# Patient Record
Sex: MALE | Race: WHITE | NOT HISPANIC OR LATINO | Employment: FULL TIME | URBAN - METROPOLITAN AREA
[De-identification: names, ages, dates, MRNs, and addresses within clinical notes are randomized per-mention and may not be internally consistent; named-entity substitution may affect disease eponyms.]

---

## 2019-06-10 ENCOUNTER — HOSPITAL ENCOUNTER (EMERGENCY)
Facility: HOSPITAL | Age: 24
Discharge: HOME | End: 2019-06-10
Attending: EMERGENCY MEDICINE
Payer: COMMERCIAL

## 2019-06-10 VITALS
HEIGHT: 66 IN | DIASTOLIC BLOOD PRESSURE: 68 MMHG | RESPIRATION RATE: 14 BRPM | WEIGHT: 143 LBS | BODY MASS INDEX: 22.98 KG/M2 | SYSTOLIC BLOOD PRESSURE: 135 MMHG | OXYGEN SATURATION: 99 % | HEART RATE: 81 BPM | TEMPERATURE: 98 F

## 2019-06-10 DIAGNOSIS — I80.02 THROMBOPHLEBITIS OF SUPERFICIAL VEINS OF LEFT LOWER EXTREMITY: Primary | ICD-10-CM

## 2019-06-10 PROCEDURE — 99282 EMERGENCY DEPT VISIT SF MDM: CPT

## 2019-06-10 NOTE — ED PROVIDER NOTES
"Encounter Date: 6/10/2019    SCRIBE #1 NOTE: I, Berkley Griffin, am scribing for, and in the presence of, Jeannie Ochoa PA-C.       History     Chief Complaint   Patient presents with    Varicose Veins     Pt states he believes a varicose vein popped in his left calf.        Time seen by provider: 2:35 PM on 06/10/2019    Boone Mills Jr. is a 24 y.o. male with no PMHx or SHx on file who presents to the ED with complaints of left calf pain that was noticed a few days ago. The patient states "I popped a varicose vein". He endorses frequently being hunched over and "has frequent blood restriction in legs". He reports calf pain is localized to the region where the varicose vein popped and is described as blue with surrounding redness. The patient applied ice to the area with improvements. He denies history of blood clots. The patient has no other medical concerns or complaints at this moment. He denies onset of any other new symptoms currently. NKDA noted.     The history is provided by the patient.     Review of patient's allergies indicates:  No Known Allergies  History reviewed. No pertinent past medical history.  History reviewed. No pertinent surgical history.  History reviewed. No pertinent family history.  Social History     Tobacco Use    Smoking status: Never Smoker    Smokeless tobacco: Current User     Types: Chew   Substance Use Topics    Alcohol use: Not on file    Drug use: Not on file     Review of Systems   Constitutional: Negative for chills and fever.   Respiratory: Negative for cough.    Cardiovascular: Negative for chest pain.   Gastrointestinal: Negative for abdominal pain and nausea.   Genitourinary: Negative for difficulty urinating.   Musculoskeletal: Positive for myalgias (left calf). Negative for back pain and joint swelling.   Skin: Negative for pallor and rash.   Neurological: Negative for weakness and numbness.   Hematological: Does not bruise/bleed easily. "   Psychiatric/Behavioral: The patient is not nervous/anxious.        Physical Exam     Initial Vitals [06/10/19 1424]   BP Pulse Resp Temp SpO2   135/68 81 14 98.1 °F (36.7 °C) 99 %      MAP       --         Physical Exam    Nursing note and vitals reviewed.  Constitutional: He appears well-developed and well-nourished. He is not diaphoretic. He is cooperative.  Non-toxic appearance. He does not have a sickly appearance. No distress.   HENT:   Head: Normocephalic and atraumatic.   Right Ear: External ear normal.   Left Ear: External ear normal.   Nose: Nose normal.   Eyes: Lids are normal.   Neck: Normal range of motion and full passive range of motion without pain.   Cardiovascular: Normal rate, regular rhythm and normal heart sounds. Exam reveals no gallop and no friction rub.    No murmur heard.  Pulmonary/Chest: Breath sounds normal. He has no wheezes. He has no rhonchi. He has no rales.   Abdominal: Normal appearance. There is no rigidity.   Musculoskeletal:   Superficial vein noted with associated tenderness to posterior left lower leg. No engorgement noted. No calf tenderness. Negative Homans sign.    Neurological: He is alert.   Skin: Skin is warm, dry and intact. No rash noted.         ED Course   Procedures  Labs Reviewed - No data to display       Imaging Results    None          Medical Decision Making:   History:   Old Medical Records: I decided to obtain old medical records.       APC / Resident Notes:   Urgent evaluation of a 24-year-old male who presents with pain to the left lower extremity.  He reports he had a engorged vein that he felt pop.  At this time he has some mild tenderness. No calf tenderness. No risk for DVT.  The area is localized over the superficial vein consistent with superficial thrombophlebitis.  We discussed symptomatic treatment. Return precautions given.       Scribe Attestation:   Scribe #1: I performed the above scribed service and the documentation accurately describes the  services I performed. I attest to the accuracy of the note.      I, Jeannie Ochoa PA-C, personally performed the services described in this documentation. All medical record entries made by the scribe were at my direction and in my presence.  I have reviewed the chart and agree that the record reflects my personal performance and is accurate and complete. Jeannie Ochoa PA-C.  2:43 PM 06/10/2019             Clinical Impression:       ICD-10-CM ICD-9-CM   1. Thrombophlebitis of superficial veins of left lower extremity I80.02 451.0         Disposition:   Disposition: Discharged  Condition: Stable                        Jeannie Ochoa PA-C  06/10/19 1737

## 2022-07-07 ENCOUNTER — OFFICE VISIT (OUTPATIENT)
Dept: URGENT CARE | Facility: CLINIC | Age: 27
End: 2022-07-07
Payer: OTHER MISCELLANEOUS

## 2022-07-07 VITALS
HEART RATE: 102 BPM | TEMPERATURE: 98 F | SYSTOLIC BLOOD PRESSURE: 151 MMHG | DIASTOLIC BLOOD PRESSURE: 86 MMHG | RESPIRATION RATE: 16 BRPM | OXYGEN SATURATION: 98 %

## 2022-07-07 DIAGNOSIS — S83.91XA SPRAIN OF RIGHT KNEE, UNSPECIFIED LIGAMENT, INITIAL ENCOUNTER: Primary | ICD-10-CM

## 2022-07-07 DIAGNOSIS — S61.214A LACERATION OF RIGHT RING FINGER WITHOUT FOREIGN BODY WITHOUT DAMAGE TO NAIL, INITIAL ENCOUNTER: ICD-10-CM

## 2022-07-07 DIAGNOSIS — Z02.6 ENCOUNTER RELATED TO WORKER'S COMPENSATION CLAIM: ICD-10-CM

## 2022-07-07 LAB
BREATH ALCOHOL: 0
CTP QC/QA: YES
POC 5 PANEL DRUG SCREEN: ABNORMAL

## 2022-07-07 PROCEDURE — 80305 DRUG TEST PRSMV DIR OPT OBS: CPT | Mod: S$GLB,,, | Performed by: PHYSICIAN ASSISTANT

## 2022-07-07 PROCEDURE — 99203 PR OFFICE/OUTPT VISIT, NEW, LEVL III, 30-44 MIN: ICD-10-PCS | Mod: 25,S$GLB,, | Performed by: PHYSICIAN ASSISTANT

## 2022-07-07 PROCEDURE — 90471 IMMUNIZATION ADMIN: CPT | Mod: S$GLB,,, | Performed by: PHYSICIAN ASSISTANT

## 2022-07-07 PROCEDURE — 99203 OFFICE O/P NEW LOW 30 MIN: CPT | Mod: 25,S$GLB,, | Performed by: PHYSICIAN ASSISTANT

## 2022-07-07 PROCEDURE — 73564 XR KNEE COMP 4 OR MORE VIEWS RIGHT: ICD-10-PCS | Mod: RT,S$GLB,, | Performed by: RADIOLOGY

## 2022-07-07 PROCEDURE — 80305 OOH NON-DOT DRUG SCREEN: ICD-10-PCS | Mod: S$GLB,,, | Performed by: PHYSICIAN ASSISTANT

## 2022-07-07 PROCEDURE — 90471 TD VACCINE GREATER THAN OR EQUAL TO 7YO WITH PRESERVATIVE IM: ICD-10-PCS | Mod: S$GLB,,, | Performed by: PHYSICIAN ASSISTANT

## 2022-07-07 PROCEDURE — 73564 X-RAY EXAM KNEE 4 OR MORE: CPT | Mod: RT,S$GLB,, | Performed by: RADIOLOGY

## 2022-07-07 PROCEDURE — 82075 POCT BREATH ALCOHOL TEST: ICD-10-PCS | Mod: S$GLB,,, | Performed by: PHYSICIAN ASSISTANT

## 2022-07-07 PROCEDURE — 90714 TD VACCINE GREATER THAN OR EQUAL TO 7YO WITH PRESERVATIVE IM: ICD-10-PCS | Mod: S$GLB,,, | Performed by: PHYSICIAN ASSISTANT

## 2022-07-07 PROCEDURE — 90714 TD VACC NO PRESV 7 YRS+ IM: CPT | Mod: S$GLB,,, | Performed by: PHYSICIAN ASSISTANT

## 2022-07-07 PROCEDURE — 82075 ASSAY OF BREATH ETHANOL: CPT | Mod: S$GLB,,, | Performed by: PHYSICIAN ASSISTANT

## 2022-07-07 RX ORDER — MUPIROCIN 20 MG/G
OINTMENT TOPICAL
Qty: 22 G | Refills: 1 | Status: SHIPPED | OUTPATIENT
Start: 2022-07-07 | End: 2022-09-02

## 2022-07-07 RX ORDER — IBUPROFEN 600 MG/1
600 TABLET ORAL EVERY 6 HOURS PRN
Qty: 30 TABLET | Refills: 1 | Status: SHIPPED | OUTPATIENT
Start: 2022-07-07 | End: 2022-07-12 | Stop reason: SDUPTHER

## 2022-07-07 NOTE — LETTER
[facility] - Urgent Care  [address] [facility], [address]  [place] [address]  Phone: [phone]  Fax: [phone]  [facility]: [id number]    Pt Name: Boone Mills Jr.  Injury Date: 07/06/2022   Employee ID: [id number] Date of First Treatment: 07/07/2022   Company: [employer]      Appointment Time: 11:00 AM Arrived: 10:46 AM   Provider: Erica Avendaño PA-C Time Out:1:50 PM      Office Treatment:   1. Sprain of right knee, unspecified ligament, initial encounter    2. Laceration of right ring finger without foreign body without damage to nail, initial encounter    3. Encounter related to worker's compensation claim      Medications Ordered This Encounter   Medications    ibuprofen (ADVIL,MOTRIN) 600 MG tablet    mupirocin (BACTROBAN) 2 % ointment      Patient Instructions: Attention not to aggravate affected area, Apply ice 24-48 hours then apply heat/warm soaks, Elevated affected area, Keep dressing clean/dry/covered    Restrictions: Avoid climbing/kneeling/squatting, Limited use of right hand and arm (Home today and tomorrow.)     Return Appointment: 7/14/2022 at 9:30 AM   SBS

## 2022-07-07 NOTE — PROGRESS NOTES
Subjective:       Patient ID: Boone Mills Jr. is a 27 y.o. male.    Chief Complaint: Hand Pain (WC Right Ring Finger) and Knee Injury (Right Knee WC)    07/06/2022, Right Middle Finger and Right Knee, Amarilys's A/C, - Patient here today for a laceration on his right ring finger. He was hanging duckwork and his hand slipped off the tool and was cut by sheet metal at approximately 4 PM yesterday. He states that it is tender to the touch. Pain level 4/10. He cleaned it water when it happened and when he went home he had used neosporin. He states that this morning when he woke up his knee was swollen he could not get out of bed. He states that he spends a lot of time on his knees while he works. He was kneeling in the attick yesterday when he got the cut to his finger.  He describes the pain as tender on the lateral side and bottom portion of his knee. Pain level 8/10. He states that he has been having a throbbing shooting pain. He states that the pain does radiate down his leg. He has taken a BC Powder this morning.      Knee Injury  This is a new problem. The current episode started yesterday. The problem occurs daily. The problem has been unchanged. Associated symptoms include arthralgias and joint swelling. Pertinent negatives include no chest pain, chills, fever or headaches. The symptoms are aggravated by walking, twisting, standing and bending. He has tried NSAIDs and position changes for the symptoms. The treatment provided moderate relief.   Hand Pain   The incident occurred 12 to 24 hours ago. The incident occurred at work. The pain is present in the right fingers. The quality of the pain is described as aching. The pain does not radiate. The pain is at a severity of 4/10. The pain is mild. The pain has been intermittent since the incident. Pertinent negatives include no chest pain. The symptoms are aggravated by movement. He has tried NSAIDs for the symptoms. The treatment provided  moderate relief.       Constitution: Negative for chills and fever.   Cardiovascular: Negative for chest pain.   Respiratory: Negative for shortness of breath.    Musculoskeletal: Positive for pain, joint pain, joint swelling and pain with walking. Negative for abnormal ROM of joint.   Skin: Positive for laceration and bruising. Negative for erythema.   Neurological: Negative for headaches and loss of consciousness.   Psychiatric/Behavioral: Negative for nervous/anxious. The patient is not nervous/anxious.         Objective:      Physical Exam  Vitals and nursing note reviewed.   Constitutional:       General: He is not in acute distress.     Appearance: He is well-developed.   Cardiovascular:      Rate and Rhythm: Normal rate and regular rhythm.      Heart sounds: No murmur heard.    No friction rub. No gallop.   Pulmonary:      Effort: Pulmonary effort is normal.      Breath sounds: Normal breath sounds. No wheezing or rales.   Musculoskeletal:         General: Normal range of motion.      Right hand: Laceration and tenderness present. No swelling. Normal range of motion.        Hands:       Right knee: Swelling and ecchymosis present. Normal range of motion. Tenderness present over the lateral joint line, LCL and patellar tendon. No ACL or PCL tenderness. No LCL laxity, MCL laxity, ACL laxity or PCL laxity. Normal meniscus.      Instability Tests: Anterior drawer test negative. Posterior drawer test negative. Anterior Lachman test negative. Medial Joy test negative and lateral Joy test negative.        Legs:    Skin:     General: Skin is warm and dry.      Findings: No erythema or rash.   Neurological:      Mental Status: He is alert and oriented to person, place, and time.   Psychiatric:         Behavior: Behavior normal.         Thought Content: Thought content normal.         Judgment: Judgment normal.       X-Ray Knee Complete 4 Or More Views Right    Result Date: 7/7/2022  EXAMINATION: XR KNEE COMP  4 OR MORE VIEWS RIGHT CLINICAL HISTORY: Unspecified injury of right lower leg, initial encounter TECHNIQUE: AP standing of both knees, PA flexion standing views of both knees, and Merchant views of both knees were performed.  A lateral view of the right knee was also performed. COMPARISON: None FINDINGS: Right: No fracture or dislocation.  No joint effusion.  Cartilage spaces are maintained. Left: No fracture or dislocation on provided views.  Cartilage spaces are maintained.     As above. Electronically signed by: Bimal Miller MD Date:    07/07/2022 Time:    13:21    Assessment:       1. Sprain of right knee, unspecified ligament, initial encounter    2. Laceration of right ring finger without foreign body without damage to nail, initial encounter    3. Encounter related to worker's compensation claim        Plan:       Patient presents with a complaint of a laceration to his right 4th finger that occurred yesterday at approximately 4:00 a.m. on a sharp piece of metal.  He has been covering wounds to the tissue surrounding it is macerated.  He has full range of motion of the finger and no signs of infection.  I do not feel suture ring is necessary at this time.  His tetanus shot was updated.  He also is complaining of right knee pain and swelling.  Patient has job entails crawling and kneeling a lot.  He thinks he must have injured yesterday somehow.  On exam he does have some pain, tenderness, and warmth to the area.  No erythema or signs of infection.  X-ray of the knee was negative for acute abnormalities.  Will place him on ibuprofen and advised on ice and heat.  Will keep him home today and tomorrow and re-evaluate in 4 days.  Possible return to restrictions or regular duty if symptoms improve.    Medications Ordered This Encounter   Medications    ibuprofen (ADVIL,MOTRIN) 600 MG tablet     Sig: Take 1 tablet (600 mg total) by mouth every 6 (six) hours as needed for Pain.     Dispense:  30 tablet      Refill:  1    mupirocin (BACTROBAN) 2 % ointment     Sig: Apply to affected area 3 times daily     Dispense:  22 g     Refill:  1     Patient Instructions: Attention not to aggravate affected area, Apply ice 24-48 hours then apply heat/warm soaks, Elevated affected area, Keep dressing clean/dry/covered   Restrictions: Avoid climbing/kneeling/squatting, Limited use of right hand and arm (Home today and tomorrow.)  Follow up in about 4 days (around 7/11/2022).

## 2022-07-12 ENCOUNTER — OFFICE VISIT (OUTPATIENT)
Dept: URGENT CARE | Facility: CLINIC | Age: 27
End: 2022-07-12
Payer: OTHER MISCELLANEOUS

## 2022-07-12 DIAGNOSIS — S61.214D LACERATION OF RIGHT RING FINGER WITHOUT FOREIGN BODY WITHOUT DAMAGE TO NAIL, SUBSEQUENT ENCOUNTER: ICD-10-CM

## 2022-07-12 DIAGNOSIS — M70.51 INFRAPATELLAR BURSITIS OF RIGHT KNEE: ICD-10-CM
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
## 2022-07-12 PROCEDURE — 99214 OFFICE O/P EST MOD 30 MIN: CPT | Mod: S$GLB,,, | Performed by: PHYSICIAN ASSISTANT

## 2022-07-12 PROCEDURE — 99214 PR OFFICE/OUTPT VISIT, EST, LEVL IV, 30-39 MIN: ICD-10-PCS | Mod: S$GLB,,, | Performed by: PHYSICIAN ASSISTANT

## 2022-07-12 RX ORDER — IBUPROFEN 600 MG/1
600 TABLET ORAL EVERY 6 HOURS PRN
Qty: 30 TABLET | Refills: 1 | Status: SHIPPED | OUTPATIENT
Start: 2022-07-12 | End: 2022-08-11

## 2022-07-12 NOTE — LETTER
Urgent Care - [address] [address]  Phone: [phone]  Fax: [phone]  [facility]: [id number]    Pt Name: Boone Mills Jr.  Injury Date: 07/06/2022   Employee ID: [id number] Date of Treatment: 07/12/2022   Company: [employer]      Appointment Time: 11:00 AM Arrived: 10:58 AM   Provider: Arnel Lindsey PA-C Time Out: 12:05 PM     Office Treatment:   1. Work related injury    2. Infrapatellar bursitis of right knee    3. Laceration of right ring finger without foreign body without damage to nail, subsequent encounter      Medications Ordered This Encounter   Medications    ibuprofen (ADVIL,MOTRIN) 600 MG tablet           Restrictions: Avoid climbing/kneeling/squatting     Return Appointment: 7/19/2022 at 01:00 PM

## 2022-07-12 NOTE — PROGRESS NOTES
Subjective:       Patient ID: Boone Mills Jr. is a 27 y.o. male.    Chief Complaint: Knee Injury    RV, (DOI:07/06/2022), Right Middle Finger and Right Knee, Amarilys's A/C, - Patient states his right middle finger is healed with 0/10 pain today. Pain level when present right knee 4/10 and when he is not moving around  0/10 pain. If he is kneeling are squatting is when he has the throbbing shooting pain. He states his job requires about 80% of kneeling and squatting. Currently taking Ibuprofen which is not helping. He still have tenderness to the bottom and outer portion of his knee. No previous knee injury. Within a hour or two he would getting swelling while standing. BG             Knee Injury  This is a new problem. The current episode started yesterday. The problem occurs daily. The problem has been unchanged. Associated symptoms include arthralgias and joint swelling. Pertinent negatives include no chest pain, chills, fever, headaches or numbness. The symptoms are aggravated by walking, twisting, standing and bending. He has tried NSAIDs and position changes for the symptoms. The treatment provided no relief.   Hand Pain   The incident occurred 12 to 24 hours ago. The incident occurred at work. The pain is present in the right fingers. The quality of the pain is described as aching. The pain does not radiate. The pain is at a severity of 4/10. The pain is mild. The pain has been intermittent since the incident. Pertinent negatives include no chest pain or numbness. The symptoms are aggravated by movement. He has tried NSAIDs for the symptoms. The treatment provided moderate relief.       Constitution: Negative for chills and fever.   Cardiovascular: Negative for chest pain.   Musculoskeletal: Positive for joint pain, joint swelling, abnormal ROM of joint and pain with walking.   Skin: Positive for laceration. Negative for erythema.   Neurological: Negative for headaches, numbness and  tingling.        Objective:      Physical Exam  Vitals and nursing note reviewed.   Constitutional:       General: He is not in acute distress.     Appearance: He is well-developed. He is not diaphoretic.   HENT:      Head: Normocephalic and atraumatic.      Right Ear: Hearing and external ear normal.      Left Ear: Hearing and external ear normal.      Nose: Nose normal. No nasal deformity.   Eyes:      General: Lids are normal. No scleral icterus.     Conjunctiva/sclera: Conjunctivae normal.   Neck:      Trachea: Trachea normal.   Cardiovascular:      Pulses: Normal pulses.   Pulmonary:      Effort: Pulmonary effort is normal. No respiratory distress.      Breath sounds: No stridor.   Musculoskeletal:      Right hand: Laceration present. No tenderness. Normal range of motion.        Hands:       Cervical back: Normal range of motion.      Right knee: Swelling (mild) present. No deformity, ecchymosis or bony tenderness. Normal range of motion. Tenderness present. No LCL laxity, MCL laxity or ACL laxity. Normal meniscus. Normal pulse.      Instability Tests: Anterior Lachman test negative. Medial Joy test negative and lateral Joy test negative.        Legs:    Skin:     General: Skin is warm and dry.      Capillary Refill: Capillary refill takes less than 2 seconds.      Findings: Laceration present. No erythema.   Neurological:      Mental Status: He is alert. He is not disoriented.      GCS: GCS eye subscore is 4. GCS verbal subscore is 5. GCS motor subscore is 6.      Sensory: No sensory deficit.   Psychiatric:         Attention and Perception: He is attentive.         Speech: Speech normal.         Behavior: Behavior normal.         Assessment:       1. Work related injury    2. Infrapatellar bursitis of right knee    3. Laceration of right ring finger without foreign body without damage to nail, subsequent encounter        Plan:         Medications Ordered This Encounter   Medications    ibuprofen  (ADVIL,MOTRIN) 600 MG tablet     Sig: Take 1 tablet (600 mg total) by mouth every 6 (six) hours as needed for Pain.     Dispense:  30 tablet     Refill:  1         Restrictions: Avoid climbing/kneeling/squatting  Follow up in about 1 week (around 7/19/2022).

## 2022-07-19 ENCOUNTER — OFFICE VISIT (OUTPATIENT)
Dept: URGENT CARE | Facility: CLINIC | Age: 27
End: 2022-07-19
Payer: OTHER MISCELLANEOUS

## 2022-07-19 DIAGNOSIS — M70.51 INFRAPATELLAR BURSITIS OF RIGHT KNEE: Primary | ICD-10-CM

## 2022-07-19 PROCEDURE — 99213 PR OFFICE/OUTPT VISIT, EST, LEVL III, 20-29 MIN: ICD-10-PCS | Mod: S$GLB,,, | Performed by: NURSE PRACTITIONER

## 2022-07-19 PROCEDURE — 99213 OFFICE O/P EST LOW 20 MIN: CPT | Mod: S$GLB,,, | Performed by: NURSE PRACTITIONER

## 2022-07-19 NOTE — LETTER
Urgent Care - [address] [address]  Phone: [phone]  Fax: [phone]  [facility]: [id number]    Pt Name: Boone Mills JrMarquis  Injury Date: 07/06/2022   Employee ID: [id number] Date of Treatment: 07/19/2022   Company: [employer]      Appointment Time: 01:00 PM Arrived: 12:53 PM   Provider: EVELIO Kohler Time Out: 01:12 PM     Office Treatment:   1. Infrapatellar bursitis of right knee          Patient Instructions: Attention not to aggravate affected area, Daily home exercises/warm soaks    Restrictions: Regular Duty, Discharged from Occupational Health     Return Appointment: if symptoms worsen or fail to improve

## 2022-07-19 NOTE — PROGRESS NOTES
Subjective:       Patient ID: Boone Mills Jr. is a 27 y.o. male.    Chief Complaint: Knee Injury    RV, (DOI:07/06/2022), Right Middle Finger and Right Knee, Amarilys's A/C, - Right middle finger is completely healed. Right knee pain level is 0/10 with movent and without movement. He is taking Ibuprofen PRN. He also wears his knee brace every other day with some daily duties. He looking to get of his restrictions  and go back to work at full duty. BG          Knee Injury  This is a new problem. The current episode started yesterday. The problem occurs rarely. The problem has been rapidly improving. Pertinent negatives include no arthralgias, chest pain, chills, fever, headaches, joint swelling or numbness. Nothing aggravates the symptoms. He has tried nothing for the symptoms.   Hand Pain   The incident occurred 12 to 24 hours ago. The incident occurred at work. The pain is present in the right fingers. The quality of the pain is described as aching. The pain does not radiate. The pain is at a severity of 4/10. The pain is mild. The pain has been intermittent since the incident. Pertinent negatives include no chest pain or numbness. The symptoms are aggravated by movement. He has tried NSAIDs for the symptoms. The treatment provided moderate relief.       Constitution: Negative. Negative for chills and fever.   HENT: Negative.    Cardiovascular: Negative.  Negative for chest pain.   Eyes: Negative.    Respiratory: Negative.    Gastrointestinal: Negative.  Negative for bowel incontinence.   Endocrine: negative.   Genitourinary: Negative.  Negative for dysuria, flank pain, bladder incontinence and pelvic pain.   Musculoskeletal: Negative.  Negative for pain, joint pain, joint swelling, abnormal ROM of joint, back pain and pain with walking.   Skin: Negative.    Allergic/Immunologic: Negative.    Neurological: Negative.  Negative for headaches, numbness and tingling.   Hematologic/Lymphatic:  Negative.    Psychiatric/Behavioral: Negative.         Objective:      Physical Exam  Vitals and nursing note reviewed.   Constitutional:       Appearance: He is well-developed.   HENT:      Head: Normocephalic and atraumatic.      Right Ear: External ear normal.      Left Ear: External ear normal.      Nose: Nose normal.   Eyes:      Conjunctiva/sclera: Conjunctivae normal.      Pupils: Pupils are equal, round, and reactive to light.   Cardiovascular:      Rate and Rhythm: Normal rate and regular rhythm.      Heart sounds: Normal heart sounds.   Abdominal:      General: Bowel sounds are normal.      Palpations: Abdomen is soft.   Musculoskeletal:         General: Normal range of motion.      Cervical back: Normal range of motion and neck supple.   Skin:     General: Skin is warm and dry.      Capillary Refill: Capillary refill takes less than 2 seconds.   Neurological:      Mental Status: He is alert and oriented to person, place, and time.   Psychiatric:         Behavior: Behavior normal.         Thought Content: Thought content normal.         Judgment: Judgment normal.         Assessment:       1. Infrapatellar bursitis of right knee        Plan:       Patient able to kneel, squat and squat to stand without any problems. He is wearing brace for support but has not required any pain medication.   Patient is at [facility] and released from care     Patient Instructions: Attention not to aggravate affected area, Daily home exercises/warm soaks   Restrictions: Regular Duty, Discharged from Occupational Health  Follow up if symptoms worsen or fail to improve.

## 2022-08-23 ENCOUNTER — OFFICE VISIT (OUTPATIENT)
Dept: URGENT CARE | Facility: CLINIC | Age: 27
End: 2022-08-23
Payer: OTHER MISCELLANEOUS

## 2022-08-23 VITALS
HEART RATE: 101 BPM | TEMPERATURE: 98 F | RESPIRATION RATE: 16 BRPM | DIASTOLIC BLOOD PRESSURE: 79 MMHG | HEIGHT: 66 IN | SYSTOLIC BLOOD PRESSURE: 135 MMHG | WEIGHT: 143 LBS | OXYGEN SATURATION: 98 % | BODY MASS INDEX: 22.98 KG/M2

## 2022-08-23 DIAGNOSIS — S81.011A LACERATION OF RIGHT KNEE, INITIAL ENCOUNTER: Primary | ICD-10-CM

## 2022-08-23 PROCEDURE — 12001 LACERATION REPAIR: ICD-10-PCS | Mod: S$GLB,,, | Performed by: FAMILY MEDICINE

## 2022-08-23 PROCEDURE — 99212 PR OFFICE/OUTPT VISIT, EST, LEVL II, 10-19 MIN: ICD-10-PCS | Mod: 25,S$GLB,, | Performed by: FAMILY MEDICINE

## 2022-08-23 PROCEDURE — 12001 RPR S/N/AX/GEN/TRNK 2.5CM/<: CPT | Mod: S$GLB,,, | Performed by: FAMILY MEDICINE

## 2022-08-23 PROCEDURE — 99212 OFFICE O/P EST SF 10 MIN: CPT | Mod: 25,S$GLB,, | Performed by: FAMILY MEDICINE

## 2022-08-23 NOTE — LETTER
[facility] - Urgent Care  [address] [place] [address]  Phone: [phone]  Fax: [phone]  [facility]: [id number]    Pt Name: Boone Mills   Injury Date: 08/23/2022   Employee ID:  Date of First Treatment: 08/23/2022   Company: [employer]      Appointment Time: 02:35 PM Arrived:    Provider: Gilberto Moreno MD Time Out:5PM     Office Treatment:   1. Laceration of right knee, initial encounter                     Return Appointment: Visit date not found at 9/2/22

## 2022-08-23 NOTE — PROGRESS NOTES
Subjective:       Patient ID: Boone Mills Jr. is a 27 y.o. male.    Chief Complaint: Knee Injury (Rt)    Pt presents a right knee injury at worlk today. Pt states he was carrying up an A/C unit into an attic and a piece of metal cut his knee, Pt is utd on tetanus a montha go      Knee Injury  This is a new problem. The current episode started today. The problem occurs constantly. The problem has been unchanged. Associated symptoms include joint swelling. Nothing aggravates the symptoms. He has tried nothing for the symptoms. The treatment provided no relief.       Musculoskeletal: Positive for joint swelling.        Objective:      Physical Exam  Vitals and nursing note reviewed.   Constitutional:       General: He is not in acute distress.     Appearance: Normal appearance. He is well-developed. He is not ill-appearing, toxic-appearing or diaphoretic.   HENT:      Head: Normocephalic and atraumatic.      Jaw: No trismus.      Right Ear: Hearing, tympanic membrane, ear canal and external ear normal.      Left Ear: Hearing, tympanic membrane, ear canal and external ear normal.      Nose: Nose normal. No nasal deformity, mucosal edema or rhinorrhea.      Right Sinus: No maxillary sinus tenderness or frontal sinus tenderness.      Left Sinus: No maxillary sinus tenderness or frontal sinus tenderness.      Mouth/Throat:      Mouth: Mucous membranes are moist.      Dentition: Normal dentition.      Pharynx: Oropharynx is clear. Uvula midline. No posterior oropharyngeal erythema or uvula swelling.   Eyes:      General: Lids are normal. No scleral icterus.        Right eye: No discharge.         Left eye: No discharge.      Extraocular Movements: Extraocular movements intact.      Conjunctiva/sclera: Conjunctivae normal.      Pupils: Pupils are equal, round, and reactive to light.      Comments: Sclera clear bilat   Neck:      Trachea: Trachea and phonation normal.   Cardiovascular:      Rate and Rhythm: Normal  rate and regular rhythm.      Pulses: Normal pulses.      Heart sounds: Normal heart sounds.   Pulmonary:      Effort: Pulmonary effort is normal. No respiratory distress.      Breath sounds: Normal breath sounds.   Abdominal:      General: Bowel sounds are normal. There is no distension.      Palpations: Abdomen is soft. There is no mass or pulsatile mass.      Tenderness: There is no abdominal tenderness.   Musculoskeletal:         General: No deformity. Normal range of motion.      Cervical back: Full passive range of motion without pain, normal range of motion and neck supple.      Comments: Right knee with 1 cm horizontal lac, no active bleeding, NV intact    Wound reparired using 4-0 no complication     Skin:     General: Skin is warm and dry.      Coloration: Skin is not pale.   Neurological:      Mental Status: He is alert and oriented to person, place, and time.      Motor: No abnormal muscle tone.      Coordination: Coordination normal.   Psychiatric:         Speech: Speech normal.         Behavior: Behavior normal. Behavior is cooperative.         Thought Content: Thought content normal.         Judgment: Judgment normal.         Assessment:       No diagnosis found.    Plan:                   No follow-ups on file.

## 2022-08-23 NOTE — PROCEDURES
Laceration Repair    Date/Time: 8/23/2022 2:50 PM  Performed by: Gilberto Moreno MD  Authorized by: Gilberto Moreno MD   Body area: lower extremity  Location details: right knee  Laceration length: 1.5 cm  Foreign bodies: no foreign bodies  Tendon involvement: none  Nerve involvement: superficial  Vascular damage: yes  Anesthesia: local infiltration    Anesthesia:  Local Anesthetic: lidocaine 1% without epinephrine  Anesthetic total: 3 mL    Patient sedated: no  Preparation: Patient was prepped and draped in the usual sterile fashion.  Irrigation solution: saline  Irrigation method: tap  Amount of cleaning: standard  Debridement: none  Degree of undermining: none  Skin closure: 4-0 nylon  Number of sutures: 3  Technique: simple  Approximation: close  Approximation difficulty: simple  Dressing: 4x4 sterile gauze  Patient tolerance: Patient tolerated the procedure well with no immediate complications       Pt advised to follow up with [facility] for suture removal in 8 days       9/2/22

## 2022-09-02 ENCOUNTER — OFFICE VISIT (OUTPATIENT)
Dept: URGENT CARE | Facility: CLINIC | Age: 27
End: 2022-09-02
Payer: OTHER MISCELLANEOUS

## 2022-09-02 VITALS
OXYGEN SATURATION: 97 % | DIASTOLIC BLOOD PRESSURE: 87 MMHG | RESPIRATION RATE: 18 BRPM | SYSTOLIC BLOOD PRESSURE: 137 MMHG | TEMPERATURE: 99 F | WEIGHT: 143 LBS | HEART RATE: 68 BPM | BODY MASS INDEX: 22.98 KG/M2 | HEIGHT: 66 IN

## 2022-09-02 DIAGNOSIS — S81.011A LACERATION OF RIGHT KNEE, INITIAL ENCOUNTER: Primary | ICD-10-CM
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
## 2022-09-02 DIAGNOSIS — T14.8XXA INFECTED WOUND: ICD-10-CM

## 2022-09-02 DIAGNOSIS — Z48.02 VISIT FOR SUTURE REMOVAL: ICD-10-CM

## 2022-09-02 DIAGNOSIS — L08.9 INFECTED WOUND: ICD-10-CM

## 2022-09-02 PROCEDURE — 99024 SUTURE REMOVAL: ICD-10-PCS | Mod: S$GLB,,, | Performed by: PHYSICIAN ASSISTANT

## 2022-09-02 PROCEDURE — 99024 POSTOP FOLLOW-UP VISIT: CPT | Mod: S$GLB,,, | Performed by: PHYSICIAN ASSISTANT

## 2022-09-02 RX ORDER — DOXYCYCLINE HYCLATE 100 MG
100 TABLET ORAL 2 TIMES DAILY
Qty: 14 TABLET | Refills: 0 | Status: SHIPPED | OUTPATIENT
Start: 2022-09-02

## 2022-09-02 NOTE — LETTER
Urgent Care - [address] [address]  Phone: [phone]  Fax: [phone]  [facility]: [email or web address]    Pt Name: Boone Mills JrMarquis  Injury Date: 08/23/2022   Employee ID:  Date of Treatment: 09/02/2022   Company: [email or web address]      Appointment Time: 11:00 AM Arrived: 11:15AM   Provider: Arnel Lindsey PA-C Time Out:12:15PM     Office Treatment:   1. Laceration of right knee, initial encounter    2. Visit for suture removal    3. Infected wound    4. Work related injury      Medications Ordered This Encounter   Medications    doxycycline (VIBRA-TABS) 100 MG tablet           Restrictions: Regular Duty, Discharged from Occupational Health     Return Appointment: Visit date not found at if needed.    -IV

## 2022-09-02 NOTE — PROCEDURES
Suture Removal    Date/Time: 9/2/2022 11:15 AM  Location procedure was performed: [facility]  Performed by: Arnel Lindsey PA-C  Authorized by: Arnel Lindsey PA-C   Pre-operative diagnosis: right knee lac  Post-operative diagnosis: same  Body area: lower extremity  Location details: right knee  Wound Appearance: clean, well healed, erythematous, nontender and no drainage  Sutures Removed: 3  Post-removal: no dressing applied  Complications: No  Estimated blood loss (mL): 0  Comments: Surrounding erythema likely inflammation, but cannot r/o cellulitis. Will tx with abx

## 2022-09-02 NOTE — PROGRESS NOTES
Subjective:       Patient ID: Boone Mills Jr. is a 27 y.o. male.    Chief Complaint: Laceration    Patient's place of employment - [employer]  Patient's job title -    Date of Injury - 8/23/22  Body part injured - Right knee  Current work status per last visit -  Back at work  Improved, same, or worse - worse     Pt presents with right knee lac. He was seen last week at urgent care and received 3 sutures. He reports redness around the wound. Denies drainage, fever or pain. Says he has been working regular duty without limitations.   MEB    Laceration   The incident occurred more than 1 week ago. The laceration is located on the Right leg. The laceration is 2 cm in size. The pain is at a severity of 1/10. The patient is experiencing no pain. His tetanus status is UTD.     Constitution: Negative for chills and fever.   HENT:  Negative for facial trauma.    Neck: Negative for neck pain and neck stiffness.   Cardiovascular:  Negative for chest pain and passing out.   Eyes:  Negative for eye trauma and eye pain.   Respiratory:  Negative for shortness of breath and wheezing.    Gastrointestinal:  Negative for abdominal trauma, abdominal pain, nausea and vomiting.   Musculoskeletal:  Positive for pain. Negative for joint pain, joint swelling and abnormal ROM of joint.   Skin:  Positive for wound, laceration and erythema. Negative for abrasion, puncture wound and bruising.   Neurological:  Negative for light-headedness, numbness and tingling.   Hematologic/Lymphatic: Negative for easy bruising/bleeding. Does not bruise/bleed easily.   Psychiatric/Behavioral:  Negative for nervous/anxious. The patient is not nervous/anxious.       Objective:      Physical Exam  Vitals and nursing note reviewed.   Constitutional:       Appearance: He is well-developed.   HENT:      Head: Normocephalic and atraumatic.      Right Ear: Hearing and external ear normal.      Left Ear: Hearing and external ear normal.      Nose:  Nose normal.   Eyes:      General: Lids are normal.      Conjunctiva/sclera: Conjunctivae normal.   Neck:      Trachea: Trachea normal.   Cardiovascular:      Pulses: Normal pulses.   Pulmonary:      Effort: Pulmonary effort is normal. No respiratory distress.      Breath sounds: No stridor.   Musculoskeletal:         General: Normal range of motion.   Skin:     General: Skin is warm and dry.      Capillary Refill: Capillary refill takes less than 2 seconds.      Findings: Erythema and laceration present.      Comments: 1.5cm lac right anterior knee , healing well, sutures intact. Erythema surrounds the wound. Nontender, no drainage.    Neurological:      Mental Status: He is alert. He is not disoriented.      GCS: GCS eye subscore is 4. GCS verbal subscore is 5. GCS motor subscore is 6.      Sensory: No sensory deficit.   Psychiatric:         Speech: Speech normal.         Behavior: Behavior normal.             Assessment:       1. Laceration of right knee, initial encounter    2. Visit for suture removal    3. Infected wound    4. Work related injury        Plan:       Suture Removal    Date/Time: 9/2/2022 11:15 AM  Location procedure was performed: [facility]  Performed by: Arnel Lindsey PA-C  Authorized by: Arnel Lindsey PA-C   Pre-operative diagnosis: right knee lac  Post-operative diagnosis: same  Body area: lower extremity  Location details: right knee  Wound Appearance: clean, well healed, erythematous, nontender and no drainage  Sutures Removed: 3  Post-removal: no dressing applied  Complications: No  Estimated blood loss (mL): 0  Comments: Surrounding erythema likely inflammation, but cannot r/o cellulitis. Will tx with abx        Medications Ordered This Encounter   Medications    doxycycline (VIBRA-TABS) 100 MG tablet     Sig: Take 1 tablet (100 mg total) by mouth 2 (two) times daily.     Dispense:  14 tablet     Refill:  0         Restrictions: Regular Duty,  Discharged from Occupational Health  Follow up if symptoms worsen or fail to improve.